# Patient Record
Sex: MALE | Race: OTHER | NOT HISPANIC OR LATINO | Employment: UNEMPLOYED | URBAN - METROPOLITAN AREA
[De-identification: names, ages, dates, MRNs, and addresses within clinical notes are randomized per-mention and may not be internally consistent; named-entity substitution may affect disease eponyms.]

---

## 2024-07-27 ENCOUNTER — APPOINTMENT (OUTPATIENT)
Dept: RADIOLOGY | Facility: HOSPITAL | Age: 19
End: 2024-07-27
Payer: COMMERCIAL

## 2024-07-27 ENCOUNTER — APPOINTMENT (OUTPATIENT)
Dept: CARDIOLOGY | Facility: HOSPITAL | Age: 19
End: 2024-07-27
Payer: COMMERCIAL

## 2024-07-27 ENCOUNTER — HOSPITAL ENCOUNTER (OUTPATIENT)
Dept: CARDIOLOGY | Facility: HOSPITAL | Age: 19
Discharge: HOME | End: 2024-07-27
Payer: COMMERCIAL

## 2024-07-27 ENCOUNTER — HOSPITAL ENCOUNTER (EMERGENCY)
Facility: HOSPITAL | Age: 19
Discharge: HOME | End: 2024-07-27
Attending: STUDENT IN AN ORGANIZED HEALTH CARE EDUCATION/TRAINING PROGRAM
Payer: COMMERCIAL

## 2024-07-27 VITALS
BODY MASS INDEX: 25.84 KG/M2 | RESPIRATION RATE: 20 BRPM | SYSTOLIC BLOOD PRESSURE: 116 MMHG | HEART RATE: 90 BPM | WEIGHT: 195 LBS | DIASTOLIC BLOOD PRESSURE: 58 MMHG | TEMPERATURE: 96.8 F | HEIGHT: 73 IN | OXYGEN SATURATION: 99 %

## 2024-07-27 DIAGNOSIS — R11.2 NAUSEA AND VOMITING, UNSPECIFIED VOMITING TYPE: Primary | ICD-10-CM

## 2024-07-27 DIAGNOSIS — F12.129: ICD-10-CM

## 2024-07-27 LAB
ALBUMIN SERPL BCP-MCNC: 4 G/DL (ref 3.4–5)
ALP SERPL-CCNC: 61 U/L (ref 33–120)
ALT SERPL W P-5'-P-CCNC: 16 U/L (ref 10–52)
ANION GAP SERPL CALC-SCNC: 11 MMOL/L (ref 10–20)
APAP SERPL-MCNC: <10 UG/ML
AST SERPL W P-5'-P-CCNC: 19 U/L (ref 9–39)
BASOPHILS # BLD AUTO: 0.05 X10*3/UL (ref 0–0.1)
BASOPHILS NFR BLD AUTO: 0.6 %
BILIRUB SERPL-MCNC: 0.4 MG/DL (ref 0–1.2)
BUN SERPL-MCNC: 22 MG/DL (ref 6–23)
CALCIUM SERPL-MCNC: 8.4 MG/DL (ref 8.6–10.3)
CARDIAC TROPONIN I PNL SERPL HS: <3 NG/L (ref 0–20)
CHLORIDE SERPL-SCNC: 104 MMOL/L (ref 98–107)
CO2 SERPL-SCNC: 29 MMOL/L (ref 21–32)
CREAT SERPL-MCNC: 1.38 MG/DL (ref 0.5–1.3)
EGFRCR SERPLBLD CKD-EPI 2021: 75 ML/MIN/1.73M*2
EOSINOPHIL # BLD AUTO: 0.09 X10*3/UL (ref 0–0.7)
EOSINOPHIL NFR BLD AUTO: 1 %
ERYTHROCYTE [DISTWIDTH] IN BLOOD BY AUTOMATED COUNT: 11.7 % (ref 11.5–14.5)
ETHANOL SERPL-MCNC: <10 MG/DL
GLUCOSE SERPL-MCNC: 101 MG/DL (ref 74–99)
HCT VFR BLD AUTO: 38.8 % (ref 41–52)
HGB BLD-MCNC: 13.2 G/DL (ref 13.5–17.5)
IMM GRANULOCYTES # BLD AUTO: 0.06 X10*3/UL (ref 0–0.7)
IMM GRANULOCYTES NFR BLD AUTO: 0.7 % (ref 0–0.9)
LYMPHOCYTES # BLD AUTO: 1.88 X10*3/UL (ref 1.2–4.8)
LYMPHOCYTES NFR BLD AUTO: 21.4 %
MAGNESIUM SERPL-MCNC: 1.69 MG/DL (ref 1.6–2.4)
MCH RBC QN AUTO: 32.8 PG (ref 26–34)
MCHC RBC AUTO-ENTMCNC: 34 G/DL (ref 32–36)
MCV RBC AUTO: 96 FL (ref 80–100)
MONOCYTES # BLD AUTO: 0.58 X10*3/UL (ref 0.1–1)
MONOCYTES NFR BLD AUTO: 6.6 %
NEUTROPHILS # BLD AUTO: 6.12 X10*3/UL (ref 1.2–7.7)
NEUTROPHILS NFR BLD AUTO: 69.7 %
NRBC BLD-RTO: 0 /100 WBCS (ref 0–0)
PLATELET # BLD AUTO: 193 X10*3/UL (ref 150–450)
POTASSIUM SERPL-SCNC: 3.8 MMOL/L (ref 3.5–5.3)
PROT SERPL-MCNC: 6 G/DL (ref 6.4–8.2)
RBC # BLD AUTO: 4.03 X10*6/UL (ref 4.5–5.9)
SALICYLATES SERPL-MCNC: <3 MG/DL
SODIUM SERPL-SCNC: 140 MMOL/L (ref 136–145)
WBC # BLD AUTO: 8.8 X10*3/UL (ref 4.4–11.3)

## 2024-07-27 PROCEDURE — 80143 DRUG ASSAY ACETAMINOPHEN: CPT | Performed by: EMERGENCY MEDICINE

## 2024-07-27 PROCEDURE — 84075 ASSAY ALKALINE PHOSPHATASE: CPT | Performed by: EMERGENCY MEDICINE

## 2024-07-27 PROCEDURE — 83735 ASSAY OF MAGNESIUM: CPT | Performed by: STUDENT IN AN ORGANIZED HEALTH CARE EDUCATION/TRAINING PROGRAM

## 2024-07-27 PROCEDURE — 2500000004 HC RX 250 GENERAL PHARMACY W/ HCPCS (ALT 636 FOR OP/ED): Performed by: EMERGENCY MEDICINE

## 2024-07-27 PROCEDURE — 36415 COLL VENOUS BLD VENIPUNCTURE: CPT | Performed by: EMERGENCY MEDICINE

## 2024-07-27 PROCEDURE — 96361 HYDRATE IV INFUSION ADD-ON: CPT

## 2024-07-27 PROCEDURE — 85025 COMPLETE CBC W/AUTO DIFF WBC: CPT | Performed by: EMERGENCY MEDICINE

## 2024-07-27 PROCEDURE — 93005 ELECTROCARDIOGRAM TRACING: CPT

## 2024-07-27 PROCEDURE — 71045 X-RAY EXAM CHEST 1 VIEW: CPT | Performed by: RADIOLOGY

## 2024-07-27 PROCEDURE — 99284 EMERGENCY DEPT VISIT MOD MDM: CPT

## 2024-07-27 PROCEDURE — 71045 X-RAY EXAM CHEST 1 VIEW: CPT

## 2024-07-27 PROCEDURE — 84484 ASSAY OF TROPONIN QUANT: CPT | Performed by: EMERGENCY MEDICINE

## 2024-07-27 PROCEDURE — 96374 THER/PROPH/DIAG INJ IV PUSH: CPT

## 2024-07-27 PROCEDURE — 2500000004 HC RX 250 GENERAL PHARMACY W/ HCPCS (ALT 636 FOR OP/ED): Performed by: STUDENT IN AN ORGANIZED HEALTH CARE EDUCATION/TRAINING PROGRAM

## 2024-07-27 RX ORDER — ONDANSETRON HYDROCHLORIDE 2 MG/ML
4 INJECTION, SOLUTION INTRAVENOUS ONCE
Status: COMPLETED | OUTPATIENT
Start: 2024-07-27 | End: 2024-07-27

## 2024-07-27 RX ADMIN — SODIUM CHLORIDE, POTASSIUM CHLORIDE, SODIUM LACTATE AND CALCIUM CHLORIDE 1000 ML: 600; 310; 30; 20 INJECTION, SOLUTION INTRAVENOUS at 21:10

## 2024-07-27 RX ADMIN — SODIUM CHLORIDE 1000 ML: 9 INJECTION, SOLUTION INTRAVENOUS at 20:21

## 2024-07-27 RX ADMIN — ONDANSETRON 4 MG: 2 INJECTION INTRAMUSCULAR; INTRAVENOUS at 20:25

## 2024-07-27 ASSESSMENT — LIFESTYLE VARIABLES
HAVE PEOPLE ANNOYED YOU BY CRITICIZING YOUR DRINKING: NO
TOTAL SCORE: 0
HAVE YOU EVER FELT YOU SHOULD CUT DOWN ON YOUR DRINKING: NO
EVER HAD A DRINK FIRST THING IN THE MORNING TO STEADY YOUR NERVES TO GET RID OF A HANGOVER: NO
EVER FELT BAD OR GUILTY ABOUT YOUR DRINKING: NO

## 2024-07-27 ASSESSMENT — COLUMBIA-SUICIDE SEVERITY RATING SCALE - C-SSRS
2. HAVE YOU ACTUALLY HAD ANY THOUGHTS OF KILLING YOURSELF?: NO
6. HAVE YOU EVER DONE ANYTHING, STARTED TO DO ANYTHING, OR PREPARED TO DO ANYTHING TO END YOUR LIFE?: NO
1. IN THE PAST MONTH, HAVE YOU WISHED YOU WERE DEAD OR WISHED YOU COULD GO TO SLEEP AND NOT WAKE UP?: NO

## 2024-07-27 ASSESSMENT — PAIN SCALES - GENERAL: PAINLEVEL_OUTOF10: 0 - NO PAIN

## 2024-07-27 ASSESSMENT — PAIN - FUNCTIONAL ASSESSMENT: PAIN_FUNCTIONAL_ASSESSMENT: 0-10

## 2024-07-27 NOTE — ED TRIAGE NOTES
PT. ARRIVED VIA EMS TO ED FROM HOME FOR N/V AFTER EATING 150MG OF AN GAS STATION EDIBLE. PT. STATES FEELING UNWELL, HAS HAD X2 EPISODES OF EMESIS DURING TRIAGE. PT. A&O X4, DENIES CP, SOB, DIARRHEA, NUMBNESS/TINGLING, DIZZINESS/LIGHTHEADEDNESS.

## 2024-07-28 NOTE — DISCHARGE INSTRUCTIONS
Please return to the ER or seek immediate medical attention if you experience new or worsening abdominal pain, persistent vomiting, persistent diarrhea, black tar stools, fever of 38C (100.4) or higher, chest pain, shortness of breath, or worsening of your current symptoms.    You are welcome back any time. Thank you for entrusting your care to us, I hope we made your visit as pleasant as possible. Wishing you well!    Dr. Crocker

## 2024-07-28 NOTE — ED PROVIDER NOTES
EMERGENCY DEPARTMENT ENCOUNTER      Pt Name: Don Jean-Baptiste  MRN: [id number]  Birthdate 2005  Date of evaluation: 7/27/2024  Provider: James Crocker DO    CHIEF COMPLAINT       Chief Complaint   Patient presents with    Vomiting    Nausea     PT. ARRIVED VIA EMS TO ED FROM HOME FOR N/V AFTER EATING 150MG OF AN GAS STATION EDIBLE. PT. STATES FEELING UNWELL, HAS HAD X2 EPISODES OF EMESIS DURING TRIAGE. PT. A&O X4, DENIES CP, SOB, DIARRHEA, NUMBNESS/TINGLING, DIZZINESS/LIGHTHEADEDNESS.     HISTORY OF PRESENT ILLNESS    Don Jean-Baptiste is a 19 y.o. year old male who presents to the ER for vomiting.  States he ate a 150 mg THC edible 2 hours prior to arrival.  He does report vomiting.  Denies abdominal pain, CP, or recent illness. Denies any other coingestion and denies any desire for SI or self harm.     PMH none  PSH none  NKDA  Denies tobacco or alcohol use, denies additional drug use  History otherwise limited due to patient's intoxication     PAST MEDICAL HISTORY     Past Medical History:   Diagnosis Date    Allergic contact dermatitis due to plants, except food 08/01/2017    Contact dermatitis due to poison ivy    Pain in unspecified shoulder     Shoulder pain    Unspecified disorder of synovium and tendon, right upper arm 06/28/2017    Biceps tendinopathy, right     CURRENT MEDICATIONS       There are no discharge medications for this patient.    SURGICAL HISTORY     No past surgical history on file.  ALLERGIES     Patient has no known allergies.  FAMILY HISTORY     No family history on file.  SOCIAL HISTORY       Social History     Tobacco Use    Smoking status: Not on file    Smokeless tobacco: Not on file   Substance Use Topics    Alcohol use: Not on file    Drug use: Not on file     PHYSICAL EXAM  (up to 7 for level 4, 8 or more for level 5)     ED Triage Vitals [07/27/24 1954]   Temperature Heart Rate Respirations BP   36 °C (96.8 °F) (!) 120 (!) 22 (!) 169/91      Pulse Ox Temp Source Heart Rate Source  Patient Position   99 % Temporal Monitor Lying      BP Location FiO2 (%)     Right arm --       Physical Exam  Vitals and nursing note reviewed.   Constitutional:       General: He is not in acute distress.     Appearance: Normal appearance. He is not ill-appearing.   HENT:      Head: Normocephalic and atraumatic. No raccoon eyes, Diaz's sign, contusion or laceration.      Jaw: No trismus or malocclusion.      Right Ear: External ear normal.      Left Ear: External ear normal.      Mouth/Throat:      Mouth: Mucous membranes are dry.      Pharynx: Oropharynx is clear.   Eyes:      Extraocular Movements: Extraocular movements intact.      Pupils: Pupils are equal, round, and reactive to light.   Neck:      Trachea: No tracheal deviation.   Cardiovascular:      Rate and Rhythm: Regular rhythm. Tachycardia present.      Pulses: Normal pulses.   Pulmonary:      Effort: No respiratory distress.      Breath sounds: No wheezing, rhonchi or rales.   Chest:      Chest wall: No tenderness.   Abdominal:      General: Abdomen is flat.      Palpations: Abdomen is soft. There is no mass.      Tenderness: There is no abdominal tenderness.   Musculoskeletal:         General: No tenderness or signs of injury.      Right lower leg: No edema.      Left lower leg: No edema.   Skin:     Coloration: Skin is not jaundiced or pale.      Findings: No petechiae, rash or wound.   Neurological:      Mental Status: He is alert.   Psychiatric:         Speech: Speech is delayed.         Behavior: Behavior is slowed.         Thought Content: Thought content does not include homicidal or suicidal ideation.         Cognition and Memory: Cognition is impaired.        DIAGNOSTIC RESULTS   LABS:  Labs Reviewed   CBC WITH AUTO DIFFERENTIAL - Abnormal       Result Value    WBC 8.8      nRBC 0.0      RBC 4.03 (*)     Hemoglobin 13.2 (*)     Hematocrit 38.8 (*)     MCV 96      MCH 32.8      MCHC 34.0      RDW 11.7      Platelets 193      Neutrophils %  69.7      Immature Granulocytes %, Automated 0.7      Lymphocytes % 21.4      Monocytes % 6.6      Eosinophils % 1.0      Basophils % 0.6      Neutrophils Absolute 6.12      Immature Granulocytes Absolute, Automated 0.06      Lymphocytes Absolute 1.88      Monocytes Absolute 0.58      Eosinophils Absolute 0.09      Basophils Absolute 0.05     COMPREHENSIVE METABOLIC PANEL - Abnormal    Glucose 101 (*)     Sodium 140      Potassium 3.8      Chloride 104      Bicarbonate 29      Anion Gap 11      Urea Nitrogen 22      Creatinine 1.38 (*)     eGFR 75      Calcium 8.4 (*)     Albumin 4.0      Alkaline Phosphatase 61      Total Protein 6.0 (*)     AST 19      Bilirubin, Total 0.4      ALT 16     ACUTE TOXICOLOGY PANEL, BLOOD - Normal    Acetaminophen <10.0      Salicylate  <3      Alcohol <10     TROPONIN I, HIGH SENSITIVITY - Normal    Troponin I, High Sensitivity <3      Narrative:     Less than 99th percentile of normal range cutoff-  Female and children under 18 years old <14 ng/L; Male <21 ng/L: Negative  Repeat testing should be performed if clinically indicated.     Female and children under 18 years old 14-50 ng/L; Male 21-50 ng/L:  Consistent with possible cardiac damage and possible increased clinical   risk. Serial measurements may help to assess extent of myocardial damage.     >50 ng/L: Consistent with cardiac damage, increased clinical risk and  myocardial infarction. Serial measurements may help assess extent of   myocardial damage.      NOTE: Children less than 1 year old may have higher baseline troponin   levels and results should be interpreted in conjunction with the overall   clinical context.     NOTE: Troponin I testing is performed using a different   testing methodology at [facility] than at other   [facility]. Direct result comparisons should only   be made within the same method.   MAGNESIUM - Normal    Magnesium 1.69       All other labs were within normal range or not  "returned as of this dictation.  Imaging  XR chest 1 view   Final Result   1.  No evidence of acute cardiopulmonary process.                  MACRO:   None        Signed by: Phuc Arellano 7/27/2024 10:23 PM   Dictation workstation:   [id number]         Procedure  Procedures  EMERGENCY DEPARTMENT COURSE/MDM:   Medical Decision Making    Vitals:    Vitals:    07/27/24 1954 07/27/24 2127 07/27/24 2241   BP: (!) 169/91 126/59 116/58   BP Location: Right arm Right arm    Patient Position: Lying Lying    Pulse: (!) 120 84 90   Resp: (!) 22 20 20   Temp: 36 °C (96.8 °F)     TempSrc: Temporal     SpO2: 99% 99% 99%   Weight: 88.5 kg (195 lb)     Height: 1.854 m (6' 1\")       Don Jean-Baptiste is a male 19 y.o. who presents to the ER for vomiting and heavy cannabis use . On arrival the patients vital signs were: Afebrile, Tachycardic, Normotensive, Tachypneic, and Oxygenating well on room air. History obtained from: patient.  Given tachycardia, tachypnea, vomiting, will obtain cardiac workup.  Plan for 2 L NS for rehydration, Zofran for antiemesis.  ED Course as of 07/28/24 1031   Sat Jul 27, 2024 2046 EKG performed at 20: 29 and independently reviewed by provider: Reveals sinus tachycardia with a rate of [], rightward axis, normal intervals, no ST changes, no T wave abnormalities, no ectopy. No STEMI. [TL]   2142 CBC and Auto Differential(!)  No acute leukocytosis, leukopenia, thrombocytosis, thrombocytopenia, or anemia [CB]   2158 Vital signs have normalized with IV fluids and patient more calm at this time on repeat assessment. [TL]   2225 Patient with mild reduction in kidney function which I advised him of and he will need to follow-up on this as an outpatient.  Recommended continued oral intake of fluids.  Otherwise I do not suspect other acute toxicology and patient is already having improvement in his neurologic status.  He is to call sober ride and they will continue to monitor him overnight.  Advised him to refrain " from any other recreational drug use.  No other toxidrome at this time. [TL]      ED Course User Index  [CB] James Crocker DO  [TL] David Page DO         Diagnoses as of 07/28/24 1031   Nausea and vomiting, unspecified vomiting type   Cannabis abuse with intoxication (Multi)       Handed off to oncoming provider pending lab results and reassessment for dispo      ED Medications administered this visit:    Medications   ondansetron (Zofran) injection 4 mg (4 mg intravenous Given 7/27/24 2025)   sodium chloride 0.9 % bolus 1,000 mL (0 mL intravenous Stopped 7/27/24 2051)   lactated Ringer's bolus 1,000 mL (0 mL intravenous Stopped 7/27/24 2210)     Please excuse any misspellings or unintended errors related to the Dragon speech recognition software used to dictate this note.    I reviewed the case with the attending ED physician. The attending ED physician agrees with the plan.      James Crocker DO  Resident  07/27/24 2035      I performed a history and physical examination of Don Jean-Baptiste and discussed his management with Dr. Crocker.  I agree with the history, physical, assessment, and plan of care, with the following exceptions: None    I was present for the following procedures: None  Time Spent in Critical Care of the patient: None  Time spent in discussions with the patient and family: 30    DO David Jara DO  07/28/24 7438

## 2024-07-29 LAB
ATRIAL RATE: 87 BPM
P AXIS: 78 DEGREES
P OFFSET: 186 MS
P ONSET: 133 MS
PR INTERVAL: 164 MS
Q ONSET: 215 MS
QRS COUNT: 15 BEATS
QRS DURATION: 110 MS
QT INTERVAL: 366 MS
QTC CALCULATION(BAZETT): 440 MS
QTC FREDERICIA: 414 MS
R AXIS: 91 DEGREES
T AXIS: 44 DEGREES
T OFFSET: 398 MS
VENTRICULAR RATE: 87 BPM

## 2024-08-03 LAB
ATRIAL RATE: 104 BPM
P AXIS: 63 DEGREES
P OFFSET: 188 MS
P ONSET: 137 MS
PR INTERVAL: 152 MS
Q ONSET: 213 MS
QRS COUNT: 17 BEATS
QRS DURATION: 116 MS
QT INTERVAL: 368 MS
QTC CALCULATION(BAZETT): 483 MS
QTC FREDERICIA: 442 MS
R AXIS: 91 DEGREES
T AXIS: 31 DEGREES
T OFFSET: 397 MS
VENTRICULAR RATE: 104 BPM

## 2024-08-11 LAB
ATRIAL RATE: 104 BPM
ATRIAL RATE: 87 BPM
P AXIS: 63 DEGREES
P AXIS: 78 DEGREES
P OFFSET: 186 MS
P OFFSET: 188 MS
P ONSET: 133 MS
P ONSET: 137 MS
PR INTERVAL: 152 MS
PR INTERVAL: 164 MS
Q ONSET: 213 MS
Q ONSET: 215 MS
QRS COUNT: 15 BEATS
QRS COUNT: 17 BEATS
QRS DURATION: 110 MS
QRS DURATION: 116 MS
QT INTERVAL: 366 MS
QT INTERVAL: 368 MS
QTC CALCULATION(BAZETT): 440 MS
QTC CALCULATION(BAZETT): 483 MS
QTC FREDERICIA: 414 MS
QTC FREDERICIA: 442 MS
R AXIS: 91 DEGREES
R AXIS: 91 DEGREES
T AXIS: 31 DEGREES
T AXIS: 44 DEGREES
T OFFSET: 397 MS
T OFFSET: 398 MS
VENTRICULAR RATE: 104 BPM
VENTRICULAR RATE: 87 BPM

## 2024-12-22 ENCOUNTER — OFFICE VISIT (OUTPATIENT)
Dept: URGENT CARE | Age: 19
End: 2024-12-22
Payer: COMMERCIAL

## 2024-12-22 VITALS
TEMPERATURE: 98.6 F | BODY MASS INDEX: 25.07 KG/M2 | WEIGHT: 190 LBS | SYSTOLIC BLOOD PRESSURE: 138 MMHG | OXYGEN SATURATION: 100 % | RESPIRATION RATE: 18 BRPM | DIASTOLIC BLOOD PRESSURE: 81 MMHG | HEART RATE: 111 BPM

## 2024-12-22 DIAGNOSIS — R50.9 FEVER, UNSPECIFIED FEVER CAUSE: ICD-10-CM

## 2024-12-22 DIAGNOSIS — R55 SYNCOPE AND COLLAPSE: Primary | ICD-10-CM

## 2024-12-22 LAB
POC RAPID INFLUENZA A: NEGATIVE
POC RAPID INFLUENZA B: NEGATIVE
POC RAPID MONO: NEGATIVE
POC SARS-COV-2 AG BINAX: NORMAL

## 2024-12-22 NOTE — PROGRESS NOTES
Subjective   Patient ID: Don Jean-Baptiste is a 19 y.o. male. They present today with a chief complaint of Fever, Cough, and Loss of Consciousness.    History of Present Illness  HPI  Patient is a 19-year-old male who has felt fatigue over the past 5 days along with on and off fevers, body aches.  Thursday he had a fever got up to take Tylenol it was very warm in his room and he passed out getting up hitting at the back of his head in the front of his head.  He passed out twice.  He has not been eating or drinking much.  Patient states his family was home at the time and the parents thought it was just because his room was too warm.  He states he has not been doing much over the past 5 days due to the fatigue, body aches and fevers.  This morning he had a similar episode but did not pass out he felt like he was going to pass out.  He did have a fever again this morning.  He did state similar episode happened to him about a year ago but he never had it checked out.  There is heart disease in his family.  Past Medical History  Allergies as of 12/22/2024    (No Known Allergies)       (Not in a hospital admission)       Past Medical History:   Diagnosis Date    Allergic contact dermatitis due to plants, except food 08/01/2017    Contact dermatitis due to poison ivy    Pain in unspecified shoulder     Shoulder pain    Unspecified disorder of synovium and tendon, right upper arm 06/28/2017    Biceps tendinopathy, right       No past surgical history on file.     reports that he has been smoking cigarettes. He does not have any smokeless tobacco history on file.    Review of Systems  Review of Systems  Gen: + fatigue, +fever, +sweats.  Head: No headache, trauma.  Eyes: No vision loss, double vision, drainage, eye pain.  ENT: No hearing changes, pain, epistaxis, congestion  Cardiac: No chest pain  Pulmonary: No shortness of breath,  pleuritic pain, + cough  Heme/lymph: No swollen glands  GI: No abdominal pain, nausea, vomiting,  diarrhea  : No  dysuria, frequency, urgency, hematuria  Musculoskeletal: No limb pain, joint pain, back pain, joint swelling or stiffness. + body aches  Skin: No rashes, pruritus, lumps, lesions.  Neuro: No Numbness, tingling, + weakness. + syncope x2  Psych: No  anxiety     Review of systems is otherwise negative unless stated above or in history of present illness.                             Objective    Vitals:    12/22/24 1826   BP: 146/87   Pulse: 103   Resp: 18   Temp: 37 °C (98.6 °F)   SpO2: 99%   Weight: 86.2 kg (190 lb)     No LMP for male patient.    Physical Exam  General: Vital signs stable, Pt is alert, no acute distress  Eyes: Conjunctiva normal, PERRL, EOMs intact  HENMT: Normocephalic, atraumatic, external ears and nose normal, no scars or masses.  No mastoid tenderness. Trachea is midline. No meningeal signs, negative Kernig and Brudzinski, moves neck freely.  No sinus tenderness  Resp: Respiratory effort is normal, no retractions, no stridor. Lungs CTA, no wheezes or rhonchi  CV: Heart is regular rate and rhythm.   Skin: + scab to forehead from laceration, +Lump to back of head where he fell and hit his head  Skel: full range of motion of upper and lower extremities.   Neuro: Normal gait, CN II-XII intact, no motor or sensory changes.  Psych: Alert and oriented ×3, judgment is appropriate, normal mood and affect   Procedures    Point of Care Test & Imaging Results from this visit  Results for orders placed or performed in visit on 12/22/24   POCT Covid-19 Rapid Antigen   Result Value Ref Range    POC FAUSTO-COV-2 AG  Presumptive negative test for SARS-CoV-2 (no antigen detected)     Presumptive negative test for SARS-CoV-2 (no antigen detected)   POCT Influenza A/B manually resulted   Result Value Ref Range    POC Rapid Influenza A Negative Negative    POC Rapid Influenza B Negative Negative   POCT Infectious mononucleosis antibody manually resulted   Result Value Ref Range    POC Rapid Rooks  Negative Negative      No results found.    Diagnostic study results (if any) were reviewed by DAVIAN Connell.    Assessment/Plan   Allergies, medications, history, and pertinent labs/EKGs/Imaging reviewed by DAVIAN Connell.     Medical Decision Making  History and physical shows a normal blood sugar 104, orthostatic vital signs noted in computer.  EKG done see results.  COVID, flu, mono testing done and were negative.  Due to patient having multiple syncopal episodes we will send the patient to the ER via squad unless he can get a ride with parents from the .  Dr. Amaro aware.    Orders and Diagnoses  Diagnoses and all orders for this visit:  Fever, unspecified fever cause  -     POCT Covid-19 Rapid Antigen  -     POCT Influenza A/B manually resulted  -     POCT Infectious mononucleosis antibody manually resulted      Medical Admin Record      Patient disposition: ED    Electronically signed by DAVIAN Connell  6:38 PM